# Patient Record
Sex: FEMALE | Race: OTHER | NOT HISPANIC OR LATINO | URBAN - METROPOLITAN AREA
[De-identification: names, ages, dates, MRNs, and addresses within clinical notes are randomized per-mention and may not be internally consistent; named-entity substitution may affect disease eponyms.]

---

## 2021-02-06 ENCOUNTER — EMERGENCY (EMERGENCY)
Facility: HOSPITAL | Age: 56
LOS: 1 days | Discharge: HOME | End: 2021-02-06
Attending: EMERGENCY MEDICINE | Admitting: EMERGENCY MEDICINE
Payer: COMMERCIAL

## 2021-02-06 VITALS
OXYGEN SATURATION: 97 % | DIASTOLIC BLOOD PRESSURE: 85 MMHG | TEMPERATURE: 98 F | SYSTOLIC BLOOD PRESSURE: 139 MMHG | HEART RATE: 83 BPM | RESPIRATION RATE: 18 BRPM

## 2021-02-06 DIAGNOSIS — Z20.822 CONTACT WITH AND (SUSPECTED) EXPOSURE TO COVID-19: ICD-10-CM

## 2021-02-06 PROCEDURE — 99282 EMERGENCY DEPT VISIT SF MDM: CPT

## 2021-02-06 NOTE — ED PROVIDER NOTE - OBJECTIVE STATEMENT
54 y/o female presents to the ED requesting COVID-19 testing. Patient is currently asymptomatic and has no other medical complaints at this time. Denies fever, chills, chest pain, SOB, cough. Patient was exposed to her daughter that turned out positive for COVID a week ago.

## 2021-02-06 NOTE — ED PROVIDER NOTE - PATIENT PORTAL LINK FT
You can access the FollowMyHealth Patient Portal offered by [facility] by registering at the following website: [email or web address]. By joining [employer]’s FollowMyHealth portal, you will also be able to view your health information using other applications (apps) compatible with our system.

## 2021-02-06 NOTE — ED ADULT TRIAGE NOTE - BP NONINVASIVE DIASTOLIC (MM HG)
No problem referring her but it seems as neurologist did not think her arm symptoms were related to her cervical spine   Order placed provide number to call   3:47 PM    85